# Patient Record
Sex: MALE | Race: WHITE | NOT HISPANIC OR LATINO | Employment: FULL TIME | URBAN - METROPOLITAN AREA
[De-identification: names, ages, dates, MRNs, and addresses within clinical notes are randomized per-mention and may not be internally consistent; named-entity substitution may affect disease eponyms.]

---

## 2020-07-16 ENCOUNTER — OFFICE VISIT CONVERTED (OUTPATIENT)
Dept: CARDIOLOGY | Facility: CLINIC | Age: 40
End: 2020-07-16
Attending: INTERNAL MEDICINE

## 2020-07-28 ENCOUNTER — HOSPITAL ENCOUNTER (OUTPATIENT)
Dept: NUCLEAR MEDICINE | Facility: HOSPITAL | Age: 40
Discharge: HOME | End: 2020-07-28
Attending: INTERNAL MEDICINE

## 2020-08-04 ENCOUNTER — CONVERSION ENCOUNTER (OUTPATIENT)
Dept: CARDIOLOGY | Facility: CLINIC | Age: 40
End: 2020-08-04
Attending: INTERNAL MEDICINE

## 2021-05-10 NOTE — H&P
History and Physical      Patient Name: Michael Andrea   Patient ID: [id number]   Sex: Male   YOB: 1980    Primary Care Provider: Purnima Benavides   Referring Provider: Purnima Benavides    Visit Date: July 16, 2020    Provider: Manuel El MD   Location: [facility]   Location Address: [address] [place] [address], [facility]   CLAUDINE Ann  [id number]   Location Phone: [phone]          Chief Complaint     Chest pain.    Bradycardia.       History Of Present Illness  Consult requested by: Purnima Benavides   Michael Andrea is a 40 year old /White male with diabetes who has had some chest discomfort recently and went to the emergency room. The chest pain is across his chest, lasted for a few hours, sharp in quality, 4/10 in intensity, had no aggravating or alleviating factors. Workup in the emergency room was unremarkable, except for sinus bradycardia. Patient has not had any syncope or presyncope symptoms.   PAST MEDICAL HISTORY: Diabetes mellitus.   FAMILY MEDICAL HISTORY: Nonsignificant for premature coronary atherosclerotic disease.   PSYCHOSOCIAL HISTORY: Does smoke half-a-pack to a pack per day. Uses alcohol rarely. Caffeine daily. Single. Denies mood changes or depression.   CURRENT MEDICATIONS: Novolog insulin; Levemir insulin; lisinopril 2.5 mg daily; omeprazole 20 mg daily; Chantix 1 mg daily.   ALLERGIES: No known drug allergies.       Review of Systems  · Constitutional  o Admits  o : fatigue, good general health lately  o Denies  o : recent weight changes   · Eyes  o Denies  o : double vision  · HENT  o Denies  o : hearing loss or ringing, chronic sinus problem, swollen glands in neck  · Cardiovascular  o Admits  o : chest pain  o Denies  o : palpitations (fast, fluttering, or skipping beats), swelling (feet, ankles, hands), shortness of breath while walking or lying flat  · Respiratory  o Denies  o : asthma or wheezing,  "COPD  · Gastrointestinal  o Denies  o : ulcers, nausea or vomiting  · Neurologic  o Admits  o : headaches  o Denies  o : lightheaded or dizzy, stroke  · Musculoskeletal  o Admits  o : joint pain, back pain  · Endocrine  o Admits  o : diabetes  o Denies  o : thyroid disease, heat or cold intolerance, excessive thirst or urination  · Heme-Lymph  o Denies  o : bleeding or bruising tendency, anemia      Vitals  Date Time BP Position Site L\R Cuff Size HR RR TEMP (F) WT  HT  BMI kg/m2 BSA m2 O2 Sat        07/16/2020 11:52 /56 Sitting    58 - R   162lbs 7oz 5'  5\" 27.03 1.84           Physical Examination  · Constitutional  o Appearance  o : Awake, alert, in no acute distress.   · Head and Face  o HEENT  o : PERRLA.  · Eyes  o Conjunctivae  o : Normal.  · Ears, Nose, Mouth and Throat  o Oral Cavity  o :   § Oral Mucosa  § : Normal.  · Neck  o Inspection/Palpation  o : No JVD.  · Respiratory  o Respiratory  o : Chest is symmetrical. Lung fields are clear. No rhonchi or wheezes heard.  · Cardiovascular  o Heart  o :   § Auscultation of Heart  § : S1, S2 normal. Regular rate and rhythm without murmurs, gallops, or rubs.  o Peripheral Vascular System  o :   § Extremities  § : Nails normal. No clubbing or cyanosis. Femoral pulses adequate. Pedal pulses adequate. No peripheral edema.  · Gastrointestinal  o Abdominal Examination  o : Abdomen soft. No masses. No guarding or rigidity. No hepatosplenomegaly. Bowel sounds normal.  · Musculoskeletal  o General  o :   § General Musculoskeletal  § : Muscle tone and strength were normal.  · Skin and Subcutaneous Tissue  o General Inspection  o : Unremarkable.  · Imaging  o Imaging  o : Chest X-ray showed no acute disease.  · EKG  o EKG  o : EKG on 07/12/2020 showed normal sinus rhythm with abnormal R-wave progression and borderline ST-elevation in the inferior leads.   · Labs  o Labs  o : D-Dimer 0.21. Creatinine 0.7. Troponin less than 0.01.           Assessment     ASSESSMENT " & PLAN:    1.  Chest discomfort, nonexertional in nature.  Risk factors of diabetes and smoking history.  Recommended a        noninvasive nuclear stress test and echocardiogram for further assessment of ischemic heart issues.  2.  Smoking.  Patient is currently on Chantix.  Counseled on the importance of cessation.  3.  Diabetes.      Manuel El MD  JH:vm             Electronically Signed by: Sapna Dotson-, Other -Author on July 21, 2020 12:57:27 PM  Electronically Co-signed by: Manuel El MD -Reviewer on July 27, 2020 10:48:30 AM

## 2021-05-15 VITALS
DIASTOLIC BLOOD PRESSURE: 56 MMHG | SYSTOLIC BLOOD PRESSURE: 114 MMHG | WEIGHT: 162.44 LBS | HEIGHT: 65 IN | HEART RATE: 58 BPM | BODY MASS INDEX: 27.06 KG/M2

## 2022-02-15 PROBLEM — F17.200 TOBACCO DEPENDENCE: Status: ACTIVE | Noted: 2022-02-15

## 2022-02-15 PROBLEM — R07.2 CHEST PAIN, PRECORDIAL: Status: ACTIVE | Noted: 2022-02-15

## 2022-02-17 ENCOUNTER — OFFICE VISIT (OUTPATIENT)
Dept: CARDIOLOGY | Facility: CLINIC | Age: 42
End: 2022-02-17

## 2022-02-17 VITALS
BODY MASS INDEX: 26.82 KG/M2 | SYSTOLIC BLOOD PRESSURE: 120 MMHG | WEIGHT: 161 LBS | HEIGHT: 65 IN | DIASTOLIC BLOOD PRESSURE: 67 MMHG | HEART RATE: 60 BPM

## 2022-02-17 DIAGNOSIS — R07.2 CHEST PAIN, PRECORDIAL: Primary | ICD-10-CM

## 2022-02-17 DIAGNOSIS — F17.200 TOBACCO DEPENDENCE: ICD-10-CM

## 2022-02-17 PROCEDURE — 99213 OFFICE O/P EST LOW 20 MIN: CPT | Performed by: INTERNAL MEDICINE

## 2022-02-17 PROCEDURE — 93000 ELECTROCARDIOGRAM COMPLETE: CPT | Performed by: INTERNAL MEDICINE

## 2022-02-17 RX ORDER — INSULIN DEGLUDEC INJECTION 100 U/ML
INJECTION, SOLUTION SUBCUTANEOUS DAILY
COMMUNITY
Start: 2022-01-19

## 2022-02-17 RX ORDER — INSULIN ASPART 100 [IU]/ML
INJECTION, SOLUTION INTRAVENOUS; SUBCUTANEOUS
COMMUNITY

## 2022-02-17 NOTE — ASSESSMENT & PLAN NOTE
Patient with recurrent chest discomfort atypical in nature risk factors of diabetes and smoking history certainly ischemic heart disease is in the differential as well as muscle skeletal or GI etiologies.  Recommended a CTA for assessment of occlusive CAD in the meantime would recommend chronic aspirin 81 mg preventatively given his history of diabetes and smoking history

## 2022-02-17 NOTE — PROGRESS NOTES
"Chief Complaint  Chest Pain (comes and goes), Slow Heart Rate (at times  40's 50's), Hypotension (80/50 at times), and Palpitations    Subjective    Patient still with intermittent chest discomfort around his heart rate can occur with activity and at rest and increasing in frequency 30 minutes to an hour at a time.  As with the chest pain his heart rate sometimes has been in the 40s and 50s and his blood pressure has been 80s over 50s as well      Past Medical History:   Diagnosis Date   • Diabetes mellitus (HCC)    • Hyperlipidemia          Current Outpatient Medications:   •  insulin aspart (NovoLOG FlexPen) 100 UNIT/ML solution pen-injector sc pen, Sliding scale, Disp: , Rfl:   •  Tresiba FlexTouch 100 UNIT/ML solution pen-injector injection, Daily., Disp: , Rfl:     There are no discontinued medications.  No Known Allergies     Social History     Tobacco Use   • Smoking status: Current Every Day Smoker     Packs/day: 0.50     Types: Cigarettes     Start date: 1998   • Smokeless tobacco: Former User     Types: Chew     Quit date: 2/17/2017   Vaping Use   • Vaping Use: Never used   Substance Use Topics   • Alcohol use: Never   • Drug use: Never       Family History   Problem Relation Age of Onset   • Cancer Mother    • No Known Problems Father    • Diabetes Sister         Objective     /67   Pulse 60   Ht 165.1 cm (65\")   Wt 73 kg (161 lb)   BMI 26.79 kg/m²       Physical Exam    General Appearance:   · no acute distress  · Alert and oriented x3  HENT:   · lips not cyanotic  · Atraumatic  Neck:  · No jvd   · supple  Respiratory:  · no respiratory distress  · normal breath sounds  · no rales  Cardiovascular:  · Regular rate and rhythm  · no S3, no S4   · no murmur  · no rub  Extremities  · No cyanosis  · lower extremity edema: none    Skin:   · warm, dry  · No rashes      Result Review :     No results found for: PROBNP       Lab Results   Component Value Date    TSH 2.400 01/06/2021      Lab Results "   Component Value Date    FREET4 1.36 01/06/2021      No results found for: DDIMERQUANT  No results found for: MG   No results found for: DIGOXIN   Lab Results   Component Value Date    TROPONINT <0.01 07/12/2020             No results found for: POCTROP         ECG 12 Lead    Date/Time: 2/17/2022 4:34 PM  Performed by: Manuel El MD  Authorized by: Manuel El MD   Comparison: compared with previous ECG   Similar to previous ECG  Rhythm: sinus rhythm  Comments: ST elevation early repolarization                   Diagnoses and all orders for this visit:    1. Chest pain, precordial (Primary)  Assessment & Plan:  Patient with recurrent chest discomfort atypical in nature risk factors of diabetes and smoking history recommended a CTA for assessment of occlusive CAD in the meantime would recommend chronic aspirin 81 mg preventatively given his history of diabetes and smoking history    Orders:  -     CT Angiogram Coronary; Future    2. Tobacco dependence  Assessment & Plan:  Counseled on cessation patient was not interested in quitting currently      Other orders  -     ECG 12 Lead          Follow Up     Return in about 6 months (around 8/17/2022) for Follow with Tracy Allen.          Patient was given instructions and counseling regarding his condition or for health maintenance advice. Please see specific information pulled into the AVS if appropriate.

## 2022-02-21 ENCOUNTER — TRANSCRIBE ORDERS (OUTPATIENT)
Dept: GENERAL RADIOLOGY | Facility: HOSPITAL | Age: 42
End: 2022-02-21

## 2022-08-05 ENCOUNTER — TELEPHONE (OUTPATIENT)
Dept: CARDIOLOGY | Facility: CLINIC | Age: 42
End: 2022-08-05

## 2022-08-05 NOTE — TELEPHONE ENCOUNTER
I tried to call patient in regards to overdue labs. He was unavailable so I left a message with call back number.

## 2023-10-17 ENCOUNTER — OFFICE VISIT (OUTPATIENT)
Dept: CARDIOLOGY | Facility: CLINIC | Age: 43
End: 2023-10-17
Payer: COMMERCIAL

## 2023-10-17 VITALS
SYSTOLIC BLOOD PRESSURE: 115 MMHG | WEIGHT: 155.4 LBS | DIASTOLIC BLOOD PRESSURE: 60 MMHG | HEART RATE: 63 BPM | BODY MASS INDEX: 25.89 KG/M2 | HEIGHT: 65 IN

## 2023-10-17 DIAGNOSIS — R00.2 PALPITATIONS: Primary | ICD-10-CM

## 2023-10-17 DIAGNOSIS — F17.200 TOBACCO DEPENDENCE: ICD-10-CM

## 2023-10-17 PROBLEM — R07.2 CHEST PAIN, PRECORDIAL: Status: RESOLVED | Noted: 2022-02-15 | Resolved: 2023-10-17

## 2023-10-17 PROBLEM — I10 ESSENTIAL HYPERTENSION: Status: ACTIVE | Noted: 2021-01-06

## 2023-10-17 PROBLEM — E10.9 TYPE I DIABETES MELLITUS: Status: ACTIVE | Noted: 2023-10-17

## 2023-10-17 PROBLEM — I10 ESSENTIAL HYPERTENSION: Status: RESOLVED | Noted: 2021-01-06 | Resolved: 2023-10-17

## 2023-10-17 NOTE — PROGRESS NOTES
"Chief Complaint  Irregular Heart Beat and Follow-up    Subjective            History of Present Illness  Michael Andrea is a 43-year-old white/ male patient who presents to the office today for complaints of chest tightness with associated palpitations that are \"taking his breath away\".  The episodes are exertional and nonexertional in nature and occur randomly.  They only last for a few seconds at a time but are occurring multiple times throughout the day over the last few weeks.  He denies any lightheadedness/dizziness, shortness of breath, syncope, or edema.  He is not prescribed any cardiac medications.  He is a smoker.  He had echocardiogram performed in 2020 showed normal heart function and stress test that was negative for ischemia.    PMH  Past Medical History:   Diagnosis Date    Diabetes mellitus     Hyperlipidemia          ALLERGY  No Known Allergies       SURGICALHX  History reviewed. No pertinent surgical history.       SOC  Social History     Socioeconomic History    Marital status: Single   Tobacco Use    Smoking status: Every Day     Packs/day: .5     Types: Cigarettes     Start date: 1998    Smokeless tobacco: Former     Types: Chew     Quit date: 2/17/2017   Vaping Use    Vaping Use: Never used   Substance and Sexual Activity    Alcohol use: Never    Drug use: Never    Sexual activity: Defer         FAMHX  Family History   Problem Relation Age of Onset    Cancer Mother     No Known Problems Father     Diabetes Sister           MEDSIGONLY  Current Outpatient Medications on File Prior to Visit   Medication Sig    insulin aspart (NovoLOG FlexPen) 100 UNIT/ML solution pen-injector sc pen Sliding scale    Tresiba FlexTouch 100 UNIT/ML solution pen-injector injection Daily.     No current facility-administered medications on file prior to visit.         Objective   /60   Pulse 63   Ht 165.1 cm (65\")   Wt 70.5 kg (155 lb 6.4 oz)   BMI 25.86 kg/m²       Physical Exam  HENT:      " "Head: Normocephalic.   Neck:      Vascular: No carotid bruit.   Cardiovascular:      Rate and Rhythm: Normal rate and regular rhythm.      Pulses: Normal pulses.      Heart sounds: Normal heart sounds. No murmur heard.  Pulmonary:      Effort: Pulmonary effort is normal.      Breath sounds: Normal breath sounds.   Musculoskeletal:      Cervical back: Neck supple.      Right lower leg: No edema.      Left lower leg: No edema.   Skin:     General: Skin is dry.   Neurological:      Mental Status: He is alert and oriented to person, place, and time.   Psychiatric:         Behavior: Behavior normal.       Result Review :   The following data was reviewed by: KATHRIN Lora on 10/17/2023:  No results found for: \"PROBNP\"     Lab Results   Component Value Date    TSH 1.08 01/25/2023      Lab Results   Component Value Date    FREET4 1.36 01/06/2021      No results found for: \"DDIMERQUANT\"  No results found for: \"MG\"   No results found for: \"DIGOXIN\"   Lab Results   Component Value Date    TROPONINT <0.01 07/12/2020           Lipid Panel          8/15/2023    14:17   Lipid Panel   Total Cholesterol 180       Triglycerides 63       HDL Cholesterol 96       LDL Cholesterol  70              Assessment and Plan    Diagnoses and all orders for this visit:    1. Palpitations (Primary)  Obtain 7-day Holter monitor to assess for possible arrhythmia.  Obtain echocardiogram to assess left ventricular systolic function and valvular function.  Avoid caffeine products.  -     Holter Monitor - 72 Hour Up To 15 Days; Future  -     Adult Transthoracic Echo Complete W/ Cont if Necessary Per Protocol; Future    2. Tobacco dependence  Tobacco abuse cessation counseling provided to patient today but was unable to get patient to commit to a cessation plan.          Follow Up   Return in about 1 year (around 10/17/2024) for Follow up with Dr El.    Patient was given instructions and counseling regarding his condition or for health " maintenance advice. Please see specific information pulled into the AVS if appropriate.     Michael Andrea  reports that he has been smoking cigarettes. He started smoking about 25 years ago. He has been smoking an average of .5 packs per day. He quit smokeless tobacco use about 6 years ago.  His smokeless tobacco use included chew.. I have educated him on the risk of diseases from using tobacco products such as cancer, COPD, and heart disease.     I advised him to quit and he is not willing to quit.    I spent 3  minutes counseling the patient.           Tracy Allen, APRN  10/17/23  09:17 EDT    Dictated Utilizing Dragon Dictation

## 2023-11-03 ENCOUNTER — TELEPHONE (OUTPATIENT)
Dept: CARDIOLOGY | Facility: CLINIC | Age: 43
End: 2023-11-03
Payer: COMMERCIAL

## 2023-11-03 NOTE — TELEPHONE ENCOUNTER
----- Message from KATHRIN Gallardo sent at 11/3/2023 10:34 AM EDT -----  Notify pt echo result:  ·  Left ventricular systolic function is normal. Calculated left ventricular EF = 58.3%  ·  Left ventricular diastolic function was normal. No significant valve disease noted.   Follow up as scheduled, notify office if symptoms persist/worsen